# Patient Record
Sex: MALE | Race: WHITE
[De-identification: names, ages, dates, MRNs, and addresses within clinical notes are randomized per-mention and may not be internally consistent; named-entity substitution may affect disease eponyms.]

---

## 2021-01-14 ENCOUNTER — HOSPITAL ENCOUNTER (OUTPATIENT)
Dept: HOSPITAL 63 - DXRAD | Age: 81
End: 2021-01-14
Attending: NURSE PRACTITIONER
Payer: MEDICARE
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
## 2021-01-14 DIAGNOSIS — S62.307A: Primary | ICD-10-CM
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
## 2021-01-14 DIAGNOSIS — M19.042: ICD-10-CM
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
## 2021-01-14 PROCEDURE — 73130 X-RAY EXAM OF HAND: CPT

## 2021-01-15 NOTE — RAD
EXAM: PA, oblique and lateral views of the left hand



DATE: 1/14/2021 4:06 PM



INDICATION: Reason: LEFT HAND PAIN / Spl. Instructions:  / History:  



COMPARISON: No Prior



FINDINGS:

Oblique fracture through the proximal fifth metacarpal shaft. Associated foreshortening of the fifth 
metacarpal. 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
ed IP joints.



IMPRESSION:

1.  Acute oblique fracture through the base of the fifth metacarpal.

2.  Multifocal degenerative changes



Electronically signed by: Ritchie Castelan MD (1/15/2021 1:26 AM) [id number]